# Patient Record
Sex: FEMALE | Race: WHITE | NOT HISPANIC OR LATINO | Employment: OTHER | URBAN - NONMETROPOLITAN AREA
[De-identification: names, ages, dates, MRNs, and addresses within clinical notes are randomized per-mention and may not be internally consistent; named-entity substitution may affect disease eponyms.]

---

## 2017-04-03 ENCOUNTER — AMBULATORY - GICH (OUTPATIENT)
Dept: FAMILY MEDICINE | Facility: OTHER | Age: 62
End: 2017-04-03

## 2017-04-03 ENCOUNTER — HISTORY (OUTPATIENT)
Dept: FAMILY MEDICINE | Facility: OTHER | Age: 62
End: 2017-04-03

## 2017-04-03 ENCOUNTER — OFFICE VISIT - GICH (OUTPATIENT)
Dept: FAMILY MEDICINE | Facility: OTHER | Age: 62
End: 2017-04-03

## 2017-04-03 DIAGNOSIS — S46.911A STRAIN OF UNSPECIFIED MUSCLE, FASCIA AND TENDON AT SHOULDER AND UPPER ARM LEVEL, RIGHT ARM, INITIAL ENCOUNTER: ICD-10-CM

## 2017-04-21 ENCOUNTER — AMBULATORY - GICH (OUTPATIENT)
Dept: SCHEDULING | Facility: OTHER | Age: 62
End: 2017-04-21

## 2017-05-16 ENCOUNTER — AMBULATORY - GICH (OUTPATIENT)
Dept: SCHEDULING | Facility: OTHER | Age: 62
End: 2017-05-16

## 2017-06-06 ENCOUNTER — AMBULATORY - GICH (OUTPATIENT)
Dept: SCHEDULING | Facility: OTHER | Age: 62
End: 2017-06-06

## 2017-06-27 ENCOUNTER — HISTORY (OUTPATIENT)
Dept: FAMILY MEDICINE | Facility: OTHER | Age: 62
End: 2017-06-27

## 2017-06-27 ENCOUNTER — OFFICE VISIT - GICH (OUTPATIENT)
Dept: FAMILY MEDICINE | Facility: OTHER | Age: 62
End: 2017-06-27

## 2017-06-27 DIAGNOSIS — E78.00 PURE HYPERCHOLESTEROLEMIA: ICD-10-CM

## 2017-06-27 DIAGNOSIS — Z01.818 ENCOUNTER FOR OTHER PREPROCEDURAL EXAMINATION: ICD-10-CM

## 2017-06-27 DIAGNOSIS — I10 ESSENTIAL (PRIMARY) HYPERTENSION: ICD-10-CM

## 2017-06-27 LAB
ABSOLUTE BASOPHILS - HISTORICAL: 0.1 THOU/CU MM
ABSOLUTE EOSINOPHILS - HISTORICAL: 0.1 THOU/CU MM
ABSOLUTE IMMATURE GRANULOCYTES(METAS,MYELOS,PROS) - HISTORICAL: 0 THOU/CU MM
ABSOLUTE LYMPHOCYTES - HISTORICAL: 2.5 THOU/CU MM (ref 0.9–2.9)
ABSOLUTE MONOCYTES - HISTORICAL: 0.7 THOU/CU MM
ABSOLUTE NEUTROPHILS - HISTORICAL: 3.8 THOU/CU MM (ref 1.7–7)
ALBUMIN SERPL-MCNC: 4.5 G/DL (ref 3.5–5.7)
ANION GAP - HISTORICAL: 10 (ref 5–18)
BASOPHILS # BLD AUTO: 0.7 %
BUN SERPL-MCNC: 22 MG/DL (ref 7–25)
BUN/CREAT RATIO - HISTORICAL: 28
CALCIUM SERPL-MCNC: 10.4 MG/DL (ref 8.6–10.3)
CHLORIDE SERPLBLD-SCNC: 100 MMOL/L (ref 98–107)
CO2 SERPL-SCNC: 27 MMOL/L (ref 21–31)
CREAT SERPL-MCNC: 0.8 MG/DL (ref 0.7–1.3)
EOSINOPHIL NFR BLD AUTO: 1.1 %
ERYTHROCYTE [DISTWIDTH] IN BLOOD BY AUTOMATED COUNT: 13.7 % (ref 11.5–15.5)
GFR IF NOT AFRICAN AMERICAN - HISTORICAL: >60 ML/MIN/1.73M2
GLUCOSE SERPL-MCNC: 96 MG/DL (ref 70–105)
HCT VFR BLD AUTO: 43 % (ref 33–51)
HEMOGLOBIN: 15 G/DL (ref 12–16)
IMMATURE GRANULOCYTES(METAS,MYELOS,PROS) - HISTORICAL: 0.1 %
LYMPHOCYTES NFR BLD AUTO: 35.8 % (ref 20–44)
MCH RBC QN AUTO: 30.5 PG (ref 26–34)
MCHC RBC AUTO-ENTMCNC: 34.9 G/DL (ref 32–36)
MCV RBC AUTO: 87 FL (ref 80–100)
MONOCYTES NFR BLD AUTO: 9.2 %
NEUTROPHILS NFR BLD AUTO: 53.1 % (ref 42–72)
PHOSPHATE SERPL-MCNC: 3.5 MG/DL (ref 2.5–5)
PLATELET # BLD AUTO: 332 THOU/CU MM (ref 140–440)
PMV BLD: 9.3 FL (ref 6.5–11)
POTASSIUM SERPL-SCNC: 3.6 MMOL/L (ref 3.5–5.1)
RED BLOOD COUNT - HISTORICAL: 4.92 MIL/CU MM (ref 4–5.2)
SODIUM SERPL-SCNC: 137 MMOL/L (ref 133–143)
WHITE BLOOD COUNT - HISTORICAL: 7.1 THOU/CU MM (ref 4.5–11)

## 2017-06-28 ENCOUNTER — AMBULATORY - GICH (OUTPATIENT)
Dept: FAMILY MEDICINE | Facility: OTHER | Age: 62
End: 2017-06-28

## 2017-06-28 DIAGNOSIS — I10 ESSENTIAL (PRIMARY) HYPERTENSION: ICD-10-CM

## 2017-07-09 ENCOUNTER — COMMUNICATION - GICH (OUTPATIENT)
Dept: FAMILY MEDICINE | Facility: OTHER | Age: 62
End: 2017-07-09

## 2017-07-09 DIAGNOSIS — E78.00 PURE HYPERCHOLESTEROLEMIA: ICD-10-CM

## 2017-07-21 ENCOUNTER — AMBULATORY - GICH (OUTPATIENT)
Dept: SCHEDULING | Facility: OTHER | Age: 62
End: 2017-07-21

## 2017-08-15 ENCOUNTER — HISTORY (OUTPATIENT)
Dept: FAMILY MEDICINE | Facility: OTHER | Age: 62
End: 2017-08-15

## 2017-08-15 ENCOUNTER — OFFICE VISIT - GICH (OUTPATIENT)
Dept: FAMILY MEDICINE | Facility: OTHER | Age: 62
End: 2017-08-15

## 2017-08-15 DIAGNOSIS — L65.8 OTHER SPECIFIED NONSCARRING HAIR LOSS: ICD-10-CM

## 2017-08-15 DIAGNOSIS — E66.9 OBESITY: ICD-10-CM

## 2017-08-15 DIAGNOSIS — I10 ESSENTIAL (PRIMARY) HYPERTENSION: ICD-10-CM

## 2017-08-15 DIAGNOSIS — Z00.00 ENCOUNTER FOR GENERAL ADULT MEDICAL EXAMINATION WITHOUT ABNORMAL FINDINGS: ICD-10-CM

## 2017-08-15 DIAGNOSIS — E78.00 PURE HYPERCHOLESTEROLEMIA: ICD-10-CM

## 2017-08-15 LAB
ALT (SGPT) - HISTORICAL: 22 IU/L (ref 7–52)
AST SERPL-CCNC: 20 IU/L (ref 13–39)
CHOL/HDL RATIO - HISTORICAL: 2.92
CHOLESTEROL TOTAL: 190 MG/DL
HDLC SERPL-MCNC: 65 MG/DL (ref 23–92)
LDLC SERPL CALC-MCNC: 104 MG/DL
NON-HDL CHOLESTEROL - HISTORICAL: 125 MG/DL
PATIENT STATUS - HISTORICAL: ABNORMAL
TRIGL SERPL-MCNC: 105 MG/DL

## 2017-08-15 ASSESSMENT — PATIENT HEALTH QUESTIONNAIRE - PHQ9: SUM OF ALL RESPONSES TO PHQ QUESTIONS 1-9: 2

## 2017-09-06 ENCOUNTER — COMMUNICATION - GICH (OUTPATIENT)
Dept: FAMILY MEDICINE | Facility: OTHER | Age: 62
End: 2017-09-06

## 2017-09-06 DIAGNOSIS — I10 ESSENTIAL (PRIMARY) HYPERTENSION: ICD-10-CM

## 2017-09-12 ENCOUNTER — AMBULATORY - GICH (OUTPATIENT)
Dept: SCHEDULING | Facility: OTHER | Age: 62
End: 2017-09-12

## 2017-10-10 ENCOUNTER — AMBULATORY - GICH (OUTPATIENT)
Dept: SCHEDULING | Facility: OTHER | Age: 62
End: 2017-10-10

## 2017-11-14 ENCOUNTER — AMBULATORY - GICH (OUTPATIENT)
Dept: SCHEDULING | Facility: OTHER | Age: 62
End: 2017-11-14

## 2017-12-27 NOTE — PROGRESS NOTES
Patient Information     Patient Name MRN Sex Merle Tierney [id number] Female 1955      Progress Notes by Merle Loza MD at 2017 12:45 PM     Author:  Merle Loza MD Service:  (none) Author Type:  Physician     Filed:  2017  6:04 PM Encounter Date:  2017 Status:  Signed     :  Merle Loza MD (Physician)            See H and P

## 2017-12-28 NOTE — TELEPHONE ENCOUNTER
Patient Information     Patient Name MRN Sex Merle Tierney [id number] Female 1955      Telephone Encounter by Crystal Orellana RN at 2017  3:32 PM     Author:  Crystal Orellana RN Service:  (none) Author Type:  NURS- Registered Nurse     Filed:  2017  3:40 PM Encounter Date:  2017 Status:  Signed     :  Crystal Orellana RN (NURS- Registered Nurse)            [facility] and pt request refill Rxs for enalapril (Vasotec) and chlorthialidone (Hygroton).  Both Rxs were filled on 8-15-17 with Qty 90 and Refill 3 by Dr. DIONE Loza.    This RN refused / declined Rx refill request due to pharmacy and pt requesting refill Rxs too soon.    Unable to complete prescription refill per RN Medication Refill Policy.................... Crystal Orellana RN ....................  2017   3:40 PM

## 2017-12-28 NOTE — PROGRESS NOTES
Patient Information     Patient Name MRN Sex Merle Tierney [id number] Female 1955      Progress Notes by Merle Loza MD at 8/15/2017  8:15 AM     Author:  Merle Loza MD Service:  (none) Author Type:  Physician     Filed:  2017  5:49 PM Encounter Date:  8/15/2017 Status:  Signed     :  Merle Loza MD (Physician)            Nursing Notes:   Dara Berger  8/15/2017  8:58 AM  Signed  Patient is here to establish care and annual physical. Patient complaining of numbness and pain in right leg and heel. States numbness started after shoulder surgery 2017. Patient states heel pain has been since school ended. Patient states at pre op appointment was discussed changing Medication, Patient would like to discuss further and decide what for refills will be needed.  Dara Berger LPN .............8/15/2017  8:12 AM        Subjective:  Merle Herrera is a 62 y.o. female who presents for routine physical exam       Routine physical examination  She is due for mammmogram , colonoscopy.  Declines breast exam ;  Patient has had two kids vaginally.  No pelvic pain ,no pressure.  No bleeding.  No breast lumps, bumps.  No family history breast, ovarian or uterine cancer.             Essential hypertension, benign   labs in may were normal.   She needs refill of vasotec/ hygroton ;  She does not want to switch medications as 'this seems to be working well'   ROS: denies any changes in hearing/ vision;   Denies chest pain, palpitations, shortness of breath or dyspnea on exertion.   Denies changes in bowel or bladder or indigestion.   Denies any pedal edema.             Pure hypercholesterolemia   on pravachol.   Needs refill.  Fasting today       Heel pain     She is having some right  heel pain;  She has found new shoe and feels better.  Declines xrays ;  Symptoms started a few months ago.  No swelling in legs that is unusual              Obesity, unspecified  obesity severity, unspecified obesity type     Wt Readings from Last 3 Encounters:    08/15/17 105.5 kg (232 lb 9.6 oz)   06/27/17 103.5 kg (228 lb 3.2 oz)   04/03/17 103.1 kg (227 lb 6.4 oz)          No Known Allergies  Current Outpatient Prescriptions on File Prior to Visit       Medication  Sig Dispense Refill     aspirin enteric coated 81 mg tablet Take 1 tablet by mouth once daily with a meal.  0     BIOTIN ORAL Take 5,000 capsules by mouth once daily.       calcium carbonate-vitamin D3, 600 mg-400 unit, (CALCIUM 600 + D) tablet Take 1 tablet by mouth 2 times daily with meals.  0     cholecalciferol (VITAMIN D) 1,000 unit tablet Take 1 tablet by mouth once daily.  0     omega-3 fatty acids-vitamin E (FISH OIL) 1,000 mg cap Take 1 capsule by mouth 2 times daily.  0     pravastatin (PRAVACHOL) 20 mg tablet TAKE 1 TABLET BY MOUTH AT BEDTIME. 90 tablet 3     No current facility-administered medications on file prior to visit.        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    Enjoys walking, fishing.                    Family Hx:   Family History       Problem   Relation Age of Onset     Hypertension  Mother      Heart Disease  Mother      Osteoporosis  Mother      Other  Mother      d pneumonia age 74       Hypertension  Father      Heart Disease  Father      d ht failure, 84       Hypertension  Brother      Heart Disease  Brother      Stroke  Brother 57     d age 67       Diabetes  Sister       "Heart Disease  Sister      pacemaker       Good Health  Sister      Cancer-breast  No Family History        Objective:  /82  Pulse 66  Ht 1.575 m (5' 2\")  Wt 105.5 kg (232 lb 9.6 oz)  BMI 42.54 kg/m2   Patient appears well, alert and oriented x 3, pleasant, cooperative. Thinning hair  JOURDAN. TM's clear, Oral pharynx with good dentition, without lesion, erythema or exudate. Moist mucous membranes. Neck supple and free of adenopathy, or masses. No thyromegaly. Lungs are clear, without wheezes, rhonchi or rales. Heart sounds are normal, no murmurs, clicks, gallops or rubs. Small amount of yeast type rash under bilateral breasts otherwise skin intact  Abdomen is obese, soft, no tenderness, masses or organomegaly. No CVAT.  Extremities are without edema. Peripheral pulses are normal. Screening neurological exam is normal without focal findings.   Heels with chaffing.  mininmal tenderness with palpation over right heel insertion site of plantar fascia  Right shoulder with post operative changes.  Small stitch removed without incident.  Scar well healed.     Declined breast and pelvic exam     Assessment:    ICD-10-CM    1. Routine physical examination Z00.00 XR MAMMO BILAT SCREENING      COLONOSCOPY SCREENING   2. Essential hypertension, benign I10 enalapril (VASOTEC) 20 mg tablet      chlorthalidone (HYGROTON) 25 mg tablet   3. Pure hypercholesterolemia E78.00 LIPID PANEL      AST (SGOT)      ALT (SGPT)      LIPID PANEL      AST (SGOT)      ALT (SGPT)   4. Obesity, unspecified obesity severity, unspecified obesity type E66.9    5. Female pattern alopecia L65.8 finasteride (PROSCAR) 5 mg tablet        Ms. Fraga Body mass index is 42.54 kg/(m^2). This is out of the normal range for a 62 y.o. Normal range for ages 18+ is between 18.5 and 24.9. To lose weight we reviewed risks and benefits of appropriate options such as diet, exercise, and medications. Patient's strategy will be  self-directed nutrition plan and " self-directed exercise program      Plan:   -- Expected clinical course discussed   -- Medications and their side effects discussed  Patient Instructions      Car Safety tips:   Wear your seatbelt at all times.   Do not drive when tired.  If drinking alcohol, find a designated .  Do NOT EVER text and drive!   Consider DRIVE MODE braxton for your phone.     Vitamin E oil to scar on right shoulder    Increase exercise as able to 30 minutes a day.  Consider pool exercises    Okay to have referral to podiatry for feet at location of choice due to flat feet, heel pain and hammer toe ( right 3rd )      Propecia refilled but at 5 mg;   Printed Rx.  Discuss with your dermatologist.     Referral to Dr. Nguyen for colonoscopy       Labs will be placed in your [employer] account    Results for orders placed or performed in visit on 06/27/17      RENAL FUNCTION PANEL      Result  Value Ref Range    SODIUM 137 133 - 143 mmol/L    POTASSIUM 3.6 3.5 - 5.1 mmol/L    CHLORIDE 100 98 - 107 mmol/L    CO2,TOTAL 27 21 - 31 mmol/L    ANION GAP 10 5 - 18                    GLUCOSE 96 70 - 105 mg/dL    CALCIUM 10.4 (H) 8.6 - 10.3 mg/dL    BUN 22 7 - 25 mg/dL    CREATININE 0.80 0.70 - 1.30 mg/dL    BUN/CREAT RATIO           28                    GFR if African American >60 >60 ml/min/1.73m2    GFR if not African American >60 >60 ml/min/1.73m2    PHOSPHORUS 3.5 2.5 - 5.0 mg/dL    ALBUMIN 4.5 3.5 - 5.7 g/dL   CBC WITH AUTO DIFFERENTIAL      Result  Value Ref Range    WHITE BLOOD COUNT         7.1 4.5 - 11.0 thou/cu mm    RED BLOOD COUNT           4.92 4.00 - 5.20 mil/cu mm    HEMOGLOBIN                15.0 12.0 - 16.0 g/dL    HEMATOCRIT                43.0 33.0 - 51.0 %    MCV                       87 80 - 100 fL    MCH                       30.5 26.0 - 34.0 pg    MCHC                      34.9 32.0 - 36.0 g/dL    RDW                       13.7 11.5 - 15.5 %    PLATELET COUNT            332 140 - 440 thou/cu mm    MPV                       9.3  6.5 - 11.0 fL    NEUTROPHILS               53.1 42.0 - 72.0 %    LYMPHOCYTES               35.8 20.0 - 44.0 %    MONOCYTES                 9.2 <12.0 %    EOSINOPHILS               1.1 <8.0 %    BASOPHILS                 0.7 <3.0 %    IMMATURE GRANULOCYTES(METAS,MYELOS,PROS) 0.1 %    ABSOLUTE NEUTROPHILS      3.8 1.7 - 7.0 thou/cu mm    ABSOLUTE LYMPHOCYTES      2.5 0.9 - 2.9 thou/cu mm    ABSOLUTE MONOCYTES        0.7 <0.9 thou/cu mm    ABSOLUTE EOSINOPHILS      0.1 <0.5 thou/cu mm    ABSOLUTE BASOPHILS        0.1 <0.3 thou/cu mm    ABSOLUTE IMMATURE GRANULOCYTES(METAS,MYELOS,PROS) 0.0 <=0.3 thou/cu mm       Work on following  a mediterranean diet; Use monosaturated fats ( olive , canola and peanut   oil; nuts, avocados), abundance of plant foods which are minimally processed, fish (salmon).  Exercise 30 minutes every day  [facility] staff will call you with your mammogram appointment.   Try to get 7-8 hours sleep each night.  Rest is important!    Please consider the following general health recommendations:    Schedule a mammogram annually.     Eat a quality diet (generally, low in simple sugars, starches, cholesterol and saturated fat.)    Please get 1000 mg of calcium in divided doses with vitamin D in your diet daily.  If you are postmenopausal increase the calcium to 1500 mg daily in divided doses    Exercise regularly.  Ideally you would have 30-60 minutes of aerobic exercise at least 4 times weekly.  Find something you enjoy and a friend to do it with you.    Maintain ideal weight.  Your Body mass index is 42.54 kg/(m^2).. Generally a BMI of 20-25 is considered ideal. Overweight is defined as 25-30, Obese is 30-35 and markedly obese is greater than 35.    Apply sun block (SPF 25 or greater) on exposed skin anytime you are out in the sun to prevent skin cancer.      Wear a seatbelt whenever you are in a car.    If you are beyond age 60 or had an early menopause for any reason, consider a bone density study every  2-3 years to see if you are at increased risk for fracture.    Check blood sugar annually.  Cholesterol annually unless you have had a normal level when last checked within 5 years.     You should have a tetanus booster at least once every 10 years.        Immunization History     Administered  Date(s) Administered     Hepatitis A (Adult) 03/11/2003, 09/16/2003     Hepatitis B, Unspecified 03/11/2003, 04/23/2003, 09/16/2003     Herpes-zoster Vaccine 02/08/2013     Influenza Virus, Unspecified 11/12/2010     Influenza, IIV4 (Age >= 3 Years) 12/27/2016     Tdap 06/09/2008       Strongly consider having a flu shot every fall.     You should have a pap  every 3 years between the ages of 30 and 70 unless you have had previous abnormal pap smear, (in these cases the exams and PAP's should be done yearly). If you have had hysterectomy in the past, your future Pap plan may be different.     Colonoscopy (an exam of the colon) is recommended every 10 years after the age of 50 to screen for colon cancer.  (More often if you are at increased risk.)    A vaccine to reduce your chances of getting shingles is available if you are over 60. Information about the vaccine is available through the clinic or at:  (http://www.nlm.nih.gov/medlineplus/druginfo/medmaster/s583704.html)     Handouts on heel pain, plantar fasciitis, plantar fasciitis exercises, nutrition, varicose veins provided to patient       Electronically signed by Merle Loza MD

## 2017-12-28 NOTE — TELEPHONE ENCOUNTER
Patient Information     Patient Name MRN Sex Merle Tierney [id number] Female 1955      Telephone Encounter by Crystal Noel RN at 2017 10:21 AM     Author:  Crystal Noel RN Service:  (none) Author Type:  NURS- Registered Nurse     Filed:  2017 10:25 AM Encounter Date:  2017 Status:  Signed     :  Crystal Noel RN (NURS- Registered Nurse)            Statins    Office visit in the past 12 months.    Last visit with JUANIS POTTS was on: 2016 in [facility]  Next visit with JUNAIS POTTS is on: No future appointment listed with this provider  Next visit with Family Practice is on: No future appointment listed in this department    Lab testing requirements:  Lipids annually.  Repeat lipids 6-8 weeks after dosage or drug change.    Last Lipids:  Chol: 205    2016  T    2016  HDL:   62    2016  LDL:  111    2016  LDL DIRECT:  No results found in past 5 years    .    Concommitant use of fibrates and statins-If it is an addition to the medication list, review note and/or discuss with provider.  If already on medication list, refill.    Max refills 12 months from last office visit.      Medication last addressed by Merle Loza MD  At pre-op appointment on 17.     Prescription refilled per RN Medication Refill Policy.................... Crystal Noel RN ....................  2017   10:24 AM

## 2017-12-29 NOTE — PATIENT INSTRUCTIONS
Patient Information     Patient Name MRN Sex Merle Tierney [id number] Female 1955      Patient Instructions by Merle Loza MD at 8/15/2017  9:03 AM     Author:  Merle Loza MD  Service:  (none) Author Type:  Physician     Filed:  2017  5:32 PM  Encounter Date:  8/15/2017 Status:  Addendum     :  Merle Loza MD (Physician)        Related Notes: Original Note by Merle Loza MD (Physician) filed at 8/15/2017  9:16 AM             Car Safety tips:   Wear your seatbelt at all times.   Do not drive when tired.  If drinking alcohol, find a designated .  Do NOT EVER text and drive!   Consider DRIVE MODE braxton for your phone.     Vitamin E oil to scar on right shoulder    Increase exercise as able to 30 minutes a day.  Consider pool exercises    Okay to have referral to podiatry for feet at location of choice due to flat feet, heel pain and hammer toe ( right 3rd )      Propecia refilled but at 5 mg;   Printed Rx.  Discuss with your dermatologist.     Referral to Dr. Nguyen for colonoscopy       Labs will be placed in your Mychart account    Results for orders placed or performed in visit on 17      RENAL FUNCTION PANEL      Result  Value Ref Range    SODIUM 137 133 - 143 mmol/L    POTASSIUM 3.6 3.5 - 5.1 mmol/L    CHLORIDE 100 98 - 107 mmol/L    CO2,TOTAL 27 21 - 31 mmol/L    ANION GAP 10 5 - 18                    GLUCOSE 96 70 - 105 mg/dL    CALCIUM 10.4 (H) 8.6 - 10.3 mg/dL    BUN 22 7 - 25 mg/dL    CREATININE 0.80 0.70 - 1.30 mg/dL    BUN/CREAT RATIO           28                    GFR if African American >60 >60 ml/min/1.73m2    GFR if not African American >60 >60 ml/min/1.73m2    PHOSPHORUS 3.5 2.5 - 5.0 mg/dL    ALBUMIN 4.5 3.5 - 5.7 g/dL   CBC WITH AUTO DIFFERENTIAL      Result  Value Ref Range    WHITE BLOOD COUNT         7.1 4.5 - 11.0 thou/cu mm    RED BLOOD COUNT           4.92 4.00 - 5.20 mil/cu mm    HEMOGLOBIN                 15.0 12.0 - 16.0 g/dL    HEMATOCRIT                43.0 33.0 - 51.0 %    MCV                       87 80 - 100 fL    MCH                       30.5 26.0 - 34.0 pg    MCHC                      34.9 32.0 - 36.0 g/dL    RDW                       13.7 11.5 - 15.5 %    PLATELET COUNT            332 140 - 440 thou/cu mm    MPV                       9.3 6.5 - 11.0 fL    NEUTROPHILS               53.1 42.0 - 72.0 %    LYMPHOCYTES               35.8 20.0 - 44.0 %    MONOCYTES                 9.2 <12.0 %    EOSINOPHILS               1.1 <8.0 %    BASOPHILS                 0.7 <3.0 %    IMMATURE GRANULOCYTES(METAS,MYELOS,PROS) 0.1 %    ABSOLUTE NEUTROPHILS      3.8 1.7 - 7.0 thou/cu mm    ABSOLUTE LYMPHOCYTES      2.5 0.9 - 2.9 thou/cu mm    ABSOLUTE MONOCYTES        0.7 <0.9 thou/cu mm    ABSOLUTE EOSINOPHILS      0.1 <0.5 thou/cu mm    ABSOLUTE BASOPHILS        0.1 <0.3 thou/cu mm    ABSOLUTE IMMATURE GRANULOCYTES(METAS,MYELOS,PROS) 0.0 <=0.3 thou/cu mm       Work on following  a mediterranean diet; Use monosaturated fats ( olive , canola and peanut   oil; nuts, avocados), abundance of plant foods which are minimally processed, fish (salmon).  Exercise 30 minutes every day  [facility] staff will call you with your mammogram appointment.   Try to get 7-8 hours sleep each night.  Rest is important!    Please consider the following general health recommendations:    Schedule a mammogram annually.     Eat a quality diet (generally, low in simple sugars, starches, cholesterol and saturated fat.)    Please get 1000 mg of calcium in divided doses with vitamin D in your diet daily.  If you are postmenopausal increase the calcium to 1500 mg daily in divided doses    Exercise regularly.  Ideally you would have 30-60 minutes of aerobic exercise at least 4 times weekly.  Find something you enjoy and a friend to do it with you.    Maintain ideal weight.  Your Body mass index is 42.54 kg/(m^2).. Generally a BMI of 20-25 is considered ideal.  Overweight is defined as 25-30, Obese is 30-35 and markedly obese is greater than 35.    Apply sun block (SPF 25 or greater) on exposed skin anytime you are out in the sun to prevent skin cancer.      Wear a seatbelt whenever you are in a car.    If you are beyond age 60 or had an early menopause for any reason, consider a bone density study every 2-3 years to see if you are at increased risk for fracture.    Check blood sugar annually.  Cholesterol annually unless you have had a normal level when last checked within 5 years.     You should have a tetanus booster at least once every 10 years.        Immunization History     Administered  Date(s) Administered     Hepatitis A (Adult) 03/11/2003, 09/16/2003     Hepatitis B, Unspecified 03/11/2003, 04/23/2003, 09/16/2003     Herpes-zoster Vaccine 02/08/2013     Influenza Virus, Unspecified 11/12/2010     Influenza, IIV4 (Age >= 3 Years) 12/27/2016     Tdap 06/09/2008       Strongly consider having a flu shot every fall.     You should have a pap  every 3 years between the ages of 30 and 70 unless you have had previous abnormal pap smear, (in these cases the exams and PAP's should be done yearly). If you have had hysterectomy in the past, your future Pap plan may be different.     Colonoscopy (an exam of the colon) is recommended every 10 years after the age of 50 to screen for colon cancer.  (More often if you are at increased risk.)    A vaccine to reduce your chances of getting shingles is available if you are over 60. Information about the vaccine is available through the clinic or at:  (http://www.nlm.nih.gov/medlineplus/druginfo/medmaster/v815914.html)     Handouts on heel pain, plantar fasciitis, plantar fasciitis exercises, nutrition, varicose veins provided to patient

## 2017-12-29 NOTE — PATIENT INSTRUCTIONS
Patient Information     Patient Name MRN Sex Merle Tierney [id number] Female 1955      Patient Instructions by Merle Loza MD at 2017 12:45 PM     Author:  Merle Loza MD  Service:  (none) Author Type:  Physician     Filed:  2017  1:51 PM  Encounter Date:  2017 Status:  Addendum     :  Merle Loza MD (Physician)        Related Notes: Original Note by Merle Loza MD (Physician) filed at 2017  1:45 PM            RECOMMENDATIONS:    Nothing to eat after midnight night prior to surgery.  Avoid Aspirin,  NSAIDS (nonsteroidal anti-inflammatory medications) like ibuprofen or aleve over the counter ten days prior to surgery.   Stop fish oil tablets 10 days prior to surgery  Okay to take your blood pressure medications with small sip of water am of surgery   It is okay to use tylenol with maximum tylenol use 3 grams in 24 hours.  If you have a febrile illness or productive cough, please contact your surgeon's office.      Post operative pain medication, including narcotic medication will be managed by your surgeon.  You should not require narcotic pain medication after two weeks.   Therefore, these medications will not be refilled by my office.           Weight Loss Strategies  1. Eat at least 3 meals a day and never skip breakfast.  2. Eat more slowly.  3. Decrease portion size.  4. Provide structure by using meal replacement bars or shakes, and/or low calorie frozen meals.  5. For good nutrition incorporate fruit, vegetables, whole grains, lean protein, and low-fat dairy.  6. Remove trigger foods from your environment to avoid impulse eating.  7. Increase physical activity: get a pedometer and aim for 10,000 steps a day or 30-35 minutes of activity 5 days per week.  8. Weigh yourself daily or at least weekly.  9. Keep a record of what you eat and your activity.  10. Establish a support system such as a friend, group or  "program.      11.  Read Antelmo Cason's \"Eat to Live\"   12.   Remember it is important to have a minimum of 1400 calories a day, okay to use olive oil, 40 grams of fiber daily.  No more than two servings ( the size of your palm) of red meat a week.           "

## 2017-12-29 NOTE — H&P
Patient Information     Patient Name MRN Sex Merle Tierney [id number] Female 1955      H&P by Merle Loza MD at 2017 12:45 PM     Author:  Merle Loza MD Service:  (none) Author Type:  Physician     Filed:  2017  6:04 PM Encounter Date:  2017 Status:  Signed     :  Merle Loza MD (Physician)            Nursing Notes:   Erica Phipps  2017 12:52 PM  Unsigned  This patient presents today for a Preoperative exam for this procedure: Rt Shoulder Arthroscopy   Date of Surgery: 17   Surgeon:  Dr. Linden Alcantar  Facility:  [facility]   Erica Phipps LPN..............2017 12:51 PM    PREOPERATIVE Anesthesia Medical Evaluation  Date of Exam: 2017    Nursing Notes:   Erica Phipps  2017 12:52 PM  Unsigned  This patient presents today for a Preoperative exam for this procedure: Rt Shoulder Arthroscopy   Date of Surgery: 17   Surgeon:  Dr. Linden Alcantar  Facility:  [facility]   Erica Phipps LPN..............2017 12:51 PM       HPI:    Preop     Patient is here at the request of Dr.Robin Alcantar prior to undergoing at the [facility]  surgery on date noted above.  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  She fell on her left side reaching over with right arm jarring her shoulder.  She has tried and failed conservative medical management.  She has met with Dr. Alcantar who is recommending surgical repair after MRI which showed tear in rotator cuff by outside report.  She also has severe AC joint arthritis. Shoulder pain is limiting her activites including writing, lifting above shoulder, reaching. She can not erase boards.     Hypertension   She has history hypertension.  She takes vasotec 20 mg at night and hygroton 25 mg in am a day.     denies any changes in hearing/ vision;   Denies chest  pain, palpitations, shortness of breath or dyspnea on exertion.   Denies changes in bowel or bladder or indigestion.   Denies any pedal edema.        High cholesterol  Takes pravachol 20 mg at night .  No muscle aches.       Proposed anesthesia:  General   Anesthesia Complications: trouble with spinal block with second knee replacement.   ( numbed the wrong side ) ; no problems with general anesthesia   History of abnormal bleeding : none   History of Blood Transfusions: none       Cards risk factors:     History    Smoking Status      Never Smoker   Smokeless Tobacco      Never Used   ,   LDL CHOLESTEROL (mg/dL)    Date Value   2016 111 (H)   ,   BP Readings from Last 1 Encounters:    17 132/80     ,   GLUCOSE (mg/dL)    Date Value   2016 94   , No results found for: HGBA1C,  yes early heart history in family Mother MI 64   MI of neumonia age 74, Dad MI 60; brother stroke age 57     CPR: yes  Allergies: Review of patient's allergies indicates no known allergies.   Latex Allergy: no  Immunization History     Administered  Date(s) Administered     Hepatitis A (Adult) 2003, 2003     Hepatitis B, Unspecified 2003, 2003, 2003     Herpes-zoster Vaccine 2013     Influenza Virus, Unspecified 2010     Influenza, IIV4 (Age >= 3 Years) 2016     Tdap 2008         Preoperative Evaluation: Obstructive Sleep Apnea screening    S: Snore -  Do you snore loudly? (louder than talking or loud enough to be heard through closed doors)(NO)  T: Tired - Do you often feel tired, fatigued, or sleepy during the daytime?(NO)  O: Observed - Has anyone ever observed you stop breathing during your sleep?(NO)  P: Pressure - Do you have or are you being treated for high blood pressure?(YES)  B: BMI - BMI greater than 35kg/m2?(YES)  A: Age - Age over 50 years old?(YES)  N: Neck - Neck circumference greater than 40 cm?(NO) 39.5 cm   G: Gender - Gender: Male?(NO)  Total number  "of \"YES\" responses:  3    Scoring: Low risk of MAGGIE 0-2  At Risk of MAGGIE: >3 High Risk of MAGGIE: 5-8      Problem List:   Patient Active Problem List     Diagnosis  Code     ONYCHOMYCOSIS, BILATERAL B35.1     Obesity E66.9     Essential hypertension, benign I10     MIXED INCONTINENCE URGE AND STRESS N39.46     ANKLE EDEMA R60.9     HAMMER TOE, ACQUIRED M20.40     POSTMENOPAUSAL STATUS N95.1     Osteoarthritis M19.90     Tremor R25.1     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    Enjoys walking, fishing.                  Obstetric History       T2      L2     SAB0   TAB0   Ectopic0   Multiple0   Live Births0      Family History       Problem   Relation Age of Onset     Hypertension  Mother      Heart Disease  Mother      Osteoporosis  Mother      Other  Mother      d pneumonia age 74       Hypertension  Father      Heart Disease  Father      d ht failure, 84       Hypertension  Brother      Heart Disease  " "Brother      Stroke  Brother 57     d age 67       Diabetes  Sister      Heart Disease  Sister      pacemaker       Good Health  Sister      Cancer-breast  No Family History         Current Medications:  Current Outpatient Rx       Medication  Sig Dispense Refill     aspirin enteric coated 81 mg tablet Take 1 tablet by mouth once daily with a meal.  0     BIOTIN ORAL Take 5,000 capsules by mouth once daily.       calcium carbonate-vitamin D3, 600 mg-400 unit, (CALCIUM 600 + D) tablet Take 1 tablet by mouth 2 times daily with meals.  0     chlorthalidone (HYGROTON) 25 mg tablet Take 1 tablet by mouth once daily. 90 tablet 3     cholecalciferol (VITAMIN D) 1,000 unit tablet Take 1 tablet by mouth once daily.  0     enalapril (VASOTEC) 20 mg tablet Take 1 tablet by mouth once daily. 90 tablet 3     FINASTERIDE (PROPECIA ORAL) Take 20 mg by mouth once daily.       omega-3 fatty acids-vitamin E (FISH OIL) 1,000 mg cap Take 1 capsule by mouth 2 times daily.  0     pravastatin (PRAVACHOL) 20 mg tablet Take 1 tablet by mouth at bedtime. 90 tablet 3     Medications have been reviewed by me and are current to the best of my knowledge and ability.    Recent use of: aspirin; will plan to stop July 2nd     HABITS:     Health Care Directive or Living Will: no    REVIEW OF SYSTEMS:  Fever/Chills or other infectious symptoms in past month:  (NO)   >10lb weight loss in past two months:  (NO)     No fever, chills, nausea, vomiting or diarrhea,.  No upper respiratory infection , no post nasal drip.   No chest pain, no rash.      EXAM:   /80  Pulse 96  Temp 97.9  F (36.6  C) (Tympanic)   Ht 1.6 m (5' 3\")  Wt 103.5 kg (228 lb 3.2 oz)  SpO2 96%  Breastfeeding? No  BMI 40.42 kg/m2 Body mass index is 40.42 kg/(m^2).  Patient appears well, alert and oriented x 3, pleasant, cooperative. Thinning hair.  Well groomed.  JOURDAN. TM's clear, Oral pharynx with good dentition, without lesion, erythema or exudate. Moist mucous membranes. " Neck supple and free of adenopathy, or masses. No thyromegaly. Lungs are clear, without wheezes, rhonchi or rales. Heart sounds are normal, no murmurs, clicks, gallops or rubs. Abdomen is soft,obese  no tenderness, masses or organomegaly. No CVAT.  Extremities are without edema. Peripheral pulses are normal. Screening neurological exam is normal without focal findings. Skin is normal without suspicious lesions noted.  + impingement in right shoulder.       DIAGNOSTICS:   1. EKG:  EKG NSR; vent rate 74   2. CXR:   Not indicated   3. Labs:        Results for orders placed or performed in visit on 06/27/17      RENAL FUNCTION PANEL      Result  Value Ref Range    SODIUM 137 133 - 143 mmol/L    POTASSIUM 3.6 3.5 - 5.1 mmol/L    CHLORIDE 100 98 - 107 mmol/L    CO2,TOTAL 27 21 - 31 mmol/L    ANION GAP 10 5 - 18                    GLUCOSE 96 70 - 105 mg/dL    CALCIUM 10.4 (H) 8.6 - 10.3 mg/dL    BUN 22 7 - 25 mg/dL    CREATININE 0.80 0.70 - 1.30 mg/dL    BUN/CREAT RATIO           28                    GFR if African American >60 >60 ml/min/1.73m2    GFR if not African American >60 >60 ml/min/1.73m2    PHOSPHORUS 3.5 2.5 - 5.0 mg/dL    ALBUMIN 4.5 3.5 - 5.7 g/dL   CBC WITH AUTO DIFFERENTIAL      Result  Value Ref Range    WHITE BLOOD COUNT         7.1 4.5 - 11.0 thou/cu mm    RED BLOOD COUNT           4.92 4.00 - 5.20 mil/cu mm    HEMOGLOBIN                15.0 12.0 - 16.0 g/dL    HEMATOCRIT                43.0 33.0 - 51.0 %    MCV                       87 80 - 100 fL    MCH                       30.5 26.0 - 34.0 pg    MCHC                      34.9 32.0 - 36.0 g/dL    RDW                       13.7 11.5 - 15.5 %    PLATELET COUNT            332 140 - 440 thou/cu mm    MPV                       9.3 6.5 - 11.0 fL    NEUTROPHILS               53.1 42.0 - 72.0 %    LYMPHOCYTES               35.8 20.0 - 44.0 %    MONOCYTES                 9.2 <12.0 %    EOSINOPHILS               1.1 <8.0 %    BASOPHILS                 0.7 <3.0  %    IMMATURE GRANULOCYTES(METAS,MYELOS,PROS) 0.1 %    ABSOLUTE NEUTROPHILS      3.8 1.7 - 7.0 thou/cu mm    ABSOLUTE LYMPHOCYTES      2.5 0.9 - 2.9 thou/cu mm    ABSOLUTE MONOCYTES        0.7 <0.9 thou/cu mm    ABSOLUTE EOSINOPHILS      0.1 <0.5 thou/cu mm    ABSOLUTE BASOPHILS        0.1 <0.3 thou/cu mm    ABSOLUTE IMMATURE GRANULOCYTES(METAS,MYELOS,PROS) 0.0 <=0.3 thou/cu mm          IMPRESSION:   Satisfactory Preoperative Anesthesia Medical Evaluation;    For above listed surgery and anesthesia:   Patient is low risk for perioperative complications.    Patient is on chronic pain medications (NO);   Patient is on antiplatlet/anticoagulation (YES)  Other medications that need adjustment perioperatively (YES)        Patient may proceed with surgery with appropriate anesthesia.  Labs/ Ekg to be faxed to surgeon's office.   Patient informed NPO after midnight night prior to surgery, to avoid NSAIDS, ASA, fish oil, and flax seed  over the counter ten days prior to surgery.   If  febrile illness or productive cough, please contact the surgeon's office.       I appreciate in advance compassionate care of this patient by Dr. Alcantar  and surgical staff.         2. Essential hypertension, benign  Doing well on current meds.   No change;     - EKG 12 LEAD; Future    3. Pure hypercholesterolemia   Work on following  a mediterranean diet; Use monosaturated fats ( olive , canola and peanut   oil; nuts, avocados), abundance of plant foods which are minimally processed,   fish (salmon).      Electronically signed by Merle Loza MD

## 2017-12-30 NOTE — NURSING NOTE
Patient Information     Patient Name MRN Sex Merle Tierney [id number] Female 1955      Nursing Note by Dara Berger at 8/15/2017  8:15 AM     Author:  Dara Berger Service:  (none) Author Type:  (none)     Filed:  8/15/2017  8:58 AM Encounter Date:  8/15/2017 Status:  Signed     :  Dara Berger            Patient is here to establish care and annual physical. Patient complaining of numbness and pain in right leg and heel. States numbness started after shoulder surgery 2017. Patient states heel pain has been since school ended. Patient states at pre op appointment was discussed changing Medication, Patient would like to discuss further and decide what for refills will be needed.  Dara Berger LPN .............8/15/2017  8:12 AM

## 2017-12-30 NOTE — NURSING NOTE
Patient Information     Patient Name MRN Sex Merle Tierney [id number] Female 1955      Nursing Note by Erica Phipps at 2017 12:45 PM     Author:  Erica Phipps Service:  (none) Author Type:  (none)     Filed:  2017  1:31 PM Encounter Date:  2017 Status:  Signed     :  Erica Phipps            This patient presents today for a Preoperative exam for this procedure: Rt Shoulder Arthroscopy   Date of Surgery: 17   Surgeon:  Dr. Linden Alcantar  Facility:  [facility]   Erica Phipps LPN..............2017 12:51 PM

## 2018-01-04 NOTE — NURSING NOTE
Patient Information     Patient Name MRN Merle Stafford [id number] Female 1955      Nursing Note by Nataly Saldana at 4/3/2017  9:30 AM     Author:  Nataly Saldana Service:  (none) Author Type:  (none)     Filed:  4/3/2017 10:00 AM Encounter Date:  4/3/2017 Status:  Signed     :  Nataly Saldana            Would like her Rt shoulder checked fell on 3/17, fell in the school parking lot-works at [employer]  Nataly Saldana ....................  4/3/2017   9:40 AM

## 2018-01-27 VITALS
HEART RATE: 66 BPM | BODY MASS INDEX: 42.8 KG/M2 | SYSTOLIC BLOOD PRESSURE: 144 MMHG | BODY MASS INDEX: 41.32 KG/M2 | WEIGHT: 232.6 LBS | WEIGHT: 227.4 LBS | SYSTOLIC BLOOD PRESSURE: 138 MMHG | HEIGHT: 62 IN | DIASTOLIC BLOOD PRESSURE: 82 MMHG | RESPIRATION RATE: 14 BRPM | DIASTOLIC BLOOD PRESSURE: 82 MMHG

## 2018-01-27 VITALS
DIASTOLIC BLOOD PRESSURE: 80 MMHG | SYSTOLIC BLOOD PRESSURE: 132 MMHG | TEMPERATURE: 97.9 F | BODY MASS INDEX: 40.43 KG/M2 | WEIGHT: 228.2 LBS | OXYGEN SATURATION: 96 % | HEIGHT: 63 IN | HEART RATE: 96 BPM

## 2018-01-30 ENCOUNTER — DOCUMENTATION ONLY (OUTPATIENT)
Dept: FAMILY MEDICINE | Facility: OTHER | Age: 63
End: 2018-01-30

## 2018-01-30 RX ORDER — CHLORTHALIDONE 25 MG/1
25 TABLET ORAL DAILY
COMMUNITY
Start: 2017-08-15

## 2018-01-30 RX ORDER — FINASTERIDE 5 MG/1
5 TABLET, FILM COATED ORAL EVERY MORNING
COMMUNITY
Start: 2017-08-15

## 2018-01-30 RX ORDER — ENALAPRIL MALEATE 20 MG/1
20 TABLET ORAL DAILY
COMMUNITY
Start: 2017-08-15

## 2018-01-30 RX ORDER — PRAVASTATIN SODIUM 20 MG
20 TABLET ORAL AT BEDTIME
COMMUNITY
Start: 2017-07-11 | End: 2018-06-16

## 2018-01-30 RX ORDER — CHLORAL HYDRATE 500 MG
1 CAPSULE ORAL 2 TIMES DAILY
COMMUNITY
Start: 2013-02-08

## 2018-01-30 RX ORDER — ASPIRIN 81 MG/1
81 TABLET ORAL DAILY
COMMUNITY
Start: 2013-02-08

## 2018-01-30 ASSESSMENT — PATIENT HEALTH QUESTIONNAIRE - PHQ9: SUM OF ALL RESPONSES TO PHQ QUESTIONS 1-9: 2

## 2018-06-16 DIAGNOSIS — E78.00 PURE HYPERCHOLESTEROLEMIA: Primary | ICD-10-CM

## 2018-06-16 NOTE — LETTER
June 19, 2018      Merle Herrera  [id number]  [place] [facility] [address]        Dear Merle,     This letter is to remind you that you are due for your annual exam in a couple months.  Due to 's FDC from [facility] we prefer you establish care with another provider.      A LIMITED refill of pravastatin has been called into your pharmacy. Additional refills require you to complete this appointment.    Please call the clinic at [phone] to schedule your appointment and ask any questions you may have.    If you should require additional refills before your scheduled appointment, please contact your pharmacy and we will refill your medication until the date of your appointment.      Thank you for choosing [facility] and [facility] for your health care needs.    Sincerely,    Refill RN  [facility]

## 2018-06-19 RX ORDER — PRAVASTATIN SODIUM 20 MG
TABLET ORAL
Qty: 90 TABLET | Refills: 0 | Status: SHIPPED | OUTPATIENT
Start: 2018-06-19

## 2018-06-19 NOTE — TELEPHONE ENCOUNTER
Pt needs to establish care  Kathi Ibanez RN.............................6/19/2018 9:19 AM    Letter sent  Kathi Ibanez RN.............................6/19/2018 9:19 AM

## 2023-09-12 ENCOUNTER — HOSPITAL ENCOUNTER (OUTPATIENT)
Dept: MRI IMAGING | Facility: OTHER | Age: 68
Discharge: HOME | End: 2023-09-12
Attending: FAMILY MEDICINE | Admitting: FAMILY MEDICINE
Payer: COMMERCIAL

## 2023-09-12 DIAGNOSIS — M70.62 GREATER TROCHANTERIC BURSITIS OF BOTH HIPS: ICD-10-CM

## 2023-09-12 DIAGNOSIS — M70.61 GREATER TROCHANTERIC BURSITIS OF BOTH HIPS: ICD-10-CM

## 2023-09-12 PROCEDURE — 73721 MRI JNT OF LWR EXTRE W/O DYE: CPT | Mod: LT

## 2023-09-25 ENCOUNTER — MEDICAL CORRESPONDENCE (OUTPATIENT)
Dept: HEALTH INFORMATION MANAGEMENT | Facility: OTHER | Age: 68
End: 2023-09-25
Payer: COMMERCIAL

## 2023-10-03 ENCOUNTER — THERAPY VISIT (OUTPATIENT)
Dept: PHYSICAL THERAPY | Facility: OTHER | Age: 68
End: 2023-10-03
Attending: FAMILY MEDICINE
Payer: COMMERCIAL

## 2023-10-03 DIAGNOSIS — S76.012A MUSCLE STRAIN OF GLUTEAL REGION, LEFT, INITIAL ENCOUNTER: ICD-10-CM

## 2023-10-03 DIAGNOSIS — M70.62 GREATER TROCHANTERIC BURSITIS OF LEFT HIP: ICD-10-CM

## 2023-10-03 PROCEDURE — 97110 THERAPEUTIC EXERCISES: CPT | Mod: GP,PO

## 2023-10-03 PROCEDURE — 97161 PT EVAL LOW COMPLEX 20 MIN: CPT | Mod: GP,PO

## 2023-10-03 NOTE — PROGRESS NOTES
"PHYSICAL THERAPY EVALUATION  Type of Visit: Evaluation    See electronic medical record for Abuse and Falls Screening details.    Subjective       Presenting condition or subjective complaint: Patient is a 68 year old female referred to physical therapy with left hip pain. Patient reports that she has been dealing with left hip pain for quite some time. Does have right but not as bad as the left. She reports that there was no injury that brought this pain. Most of her pain is in \"the joint\" and does radiate into the front. She reports that the physician diagnosed it as bursitis. She does have some arthritis in the hip but she reports not bad enough for surgery. She denies numbness and tingling in the leg. She really struggles with going down stairs. She does better going up but when she goes down a lot her pain increases. She reports the left leg is her leg she relies on more and feels this may have lead to more increased pain in the hip. She requests that we do aquatic therapy. Does report that she has been started treatment for Parkinsons.   Date of onset: 09/27/23    Dates & types of surgery: Two knee replacements - Over ten years ago. 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  Hobbies/Interests:      Patient goals for therapy: Stairs, sleeping without major discomfort, laundry, getting in/out of car    Pain assessment: Location: Left hip/Rating: aching, can be sharp     Objective   HIP " EVALUATION  PAIN: Pain Quality: Aching, Dull, and Sharp  Pain Frequency: constant  Pain is Exacerbated By: Stairs, walking, getting in/out of car  Pain is Relieved By: heat and rest  INTEGUMENTARY (edema, incisions): WNL  POSTURE: Protracted shoulders, forward head  GAIT:   Weightbearing Status: WBAT  Assistive Device(s): None  Gait Deviations: Does have a mild antalgic pattern with mild trendelenburg. Slower gait speed  BALANCE/PROPRIOCEPTION: Patient notes that she hasn't had any falls but has recently been treated for the start of Parkinson's Disease. She Has been more careful and realized that her walking and balance isn't as good as it has been in the past.   ROM:  Stiffness noted with hip adduction, hip flexion, and hip IR. She does have discomfort with hip ER however she is more stiff in hip IR.   PELVIC/SI SCREEN:  no major findings  STRENGTH:   Hip flexion: 5/5 bilaterally   Hip adduction: 5/5 bilaterally   Hip abduction: 5/5 on left however painful   Hip ER: 4/5 with pain   Knee flexion: 5/5   Knee extension: 5/5    SPECIAL TESTS:  Painful with scour testing. Pain with pressure to bursa of hip.   PALPATION: Discomfort noted with palpation to gluteus medius, piriformis, and around the greater trochanter.     Assessment & Plan   CLINICAL IMPRESSIONS  Medical Diagnosis: Greater trochanteric bursitis of left hip (M70.62), Muscle strain of gluteal region, left, initial encounter (S76.012A)    Treatment Diagnosis: Impaired mobility, decreased strength and endurance, left hip pain.   Impression/Assessment: Patient is a 68 year old female with Left hip complaints.  The following significant findings have been identified: Pain, Decreased ROM/flexibility, Decreased joint mobility, Decreased strength, Impaired balance, Impaired gait, Impaired muscle performance, and Decreased activity tolerance. These impairments interfere with their ability to perform self care tasks, recreational activities, household chores,  driving , household mobility, and community mobility as compared to previous level of function.     Clinical Decision Making (Complexity):  Clinical Presentation: Stable/Uncomplicated  Clinical Presentation Rationale: based on medical and personal factors listed in PT evaluation  Clinical Decision Making (Complexity): Low complexity    PLAN OF CARE  Treatment Interventions:  Modalities: Cryotherapy, E-stim, Hot Pack, Ultrasound, Vasoneumatic Device  Interventions: Gait Training, Manual Therapy, Neuromuscular Re-education, Therapeutic Activity, Therapeutic Exercise, Aquatic Therapy    Long Term Goals     PT Goal 1  Goal Identifier: Stairs  Goal Description: Patient will be able to ascend/descend 2 flights of stairs without an increase in leg pain in order to improve her overall mobility at home  Rationale: to maximize safety and independence within the home  Target Date: 11/28/23  PT Goal 2  Goal Identifier: Car  Goal Description: Patient will be able to get in/out of her car with no increase in leg pain in order to improve her overall mobility in the community  Rationale: to maximize safety and independence within the community  Target Date: 11/28/23  PT Goal 3  Goal Identifier: Sleep  Goal Description: Patient will be able to sleep a full night without being woken up by pain in order to improve her overall quality of life.  Rationale: to maximize safety and independence with performance of ADLs and functional tasks  Target Date: 11/28/23      Frequency of Treatment: 1-2 times per week  Duration of Treatment: 8 weeks    Education Assessment:   Learner/Method: Patient  Education Comments: Educated patient on hip anatomy    Risks and benefits of evaluation/treatment have been explained.   Patient/Family/caregiver agrees with Plan of Care.     Evaluation Time:     PT Eval, Low Complexity Minutes (41957): 30     Signing Clinician: Klever Valdes PT      [facility]                                                                                    OUTPATIENT PHYSICAL THERAPY      PLAN OF TREATMENT FOR OUTPATIENT REHABILITATION   Patient's Last Name, First Name, Merle Tejeda YOB: 1955   Provider's Name   [facility]   Medical Record No.  [id number]     Onset Date: 09/27/23  Start of Care Date: 10/03/23     Medical Diagnosis:  Greater trochanteric bursitis of left hip (M70.62), Muscle strain of gluteal region, left, initial encounter (S76.012A)      PT Treatment Diagnosis:  Impaired mobility, decreased strength and endurance, left hip pain. Plan of Treatment  Frequency/Duration: 1-2 times per week/ 8 weeks    Certification date from 10/03/23 to 11/28/23         See note for plan of treatment details and functional goals     Klever Valdes, PT                         I CERTIFY THE NEED FOR THESE SERVICES FURNISHED UNDER        THIS PLAN OF TREATMENT AND WHILE UNDER MY CARE .             Physician Signature               Date    X_____________________________________________________                    Referring Provider:  Kane Noble      Initial Assessment  See Epic Evaluation- Start of Care Date: 10/03/23

## 2023-10-04 ENCOUNTER — THERAPY VISIT (OUTPATIENT)
Dept: PHYSICAL THERAPY | Facility: OTHER | Age: 68
End: 2023-10-04
Attending: FAMILY MEDICINE
Payer: COMMERCIAL

## 2023-10-04 DIAGNOSIS — M70.62 GREATER TROCHANTERIC BURSITIS OF LEFT HIP: Primary | ICD-10-CM

## 2023-10-04 DIAGNOSIS — S76.012A MUSCLE STRAIN OF GLUTEAL REGION, LEFT, INITIAL ENCOUNTER: ICD-10-CM

## 2023-10-04 PROCEDURE — 97113 AQUATIC THERAPY/EXERCISES: CPT | Mod: GP,PN

## 2023-10-11 ENCOUNTER — THERAPY VISIT (OUTPATIENT)
Dept: PHYSICAL THERAPY | Facility: OTHER | Age: 68
End: 2023-10-11
Attending: FAMILY MEDICINE
Payer: COMMERCIAL

## 2023-10-11 DIAGNOSIS — S76.012A MUSCLE STRAIN OF GLUTEAL REGION, LEFT, INITIAL ENCOUNTER: ICD-10-CM

## 2023-10-11 DIAGNOSIS — M70.62 GREATER TROCHANTERIC BURSITIS OF LEFT HIP: Primary | ICD-10-CM

## 2023-10-11 PROCEDURE — 97113 AQUATIC THERAPY/EXERCISES: CPT | Mod: GP,PN

## 2023-10-17 ENCOUNTER — THERAPY VISIT (OUTPATIENT)
Dept: PHYSICAL THERAPY | Facility: OTHER | Age: 68
End: 2023-10-17
Attending: FAMILY MEDICINE
Payer: COMMERCIAL

## 2023-10-17 DIAGNOSIS — M70.62 GREATER TROCHANTERIC BURSITIS OF LEFT HIP: Primary | ICD-10-CM

## 2023-10-17 DIAGNOSIS — S76.012A MUSCLE STRAIN OF GLUTEAL REGION, LEFT, INITIAL ENCOUNTER: ICD-10-CM

## 2023-10-17 PROCEDURE — 97113 AQUATIC THERAPY/EXERCISES: CPT | Mod: GP,PN

## 2023-10-24 ENCOUNTER — THERAPY VISIT (OUTPATIENT)
Dept: PHYSICAL THERAPY | Facility: OTHER | Age: 68
End: 2023-10-24
Attending: FAMILY MEDICINE
Payer: COMMERCIAL

## 2023-10-24 DIAGNOSIS — M70.62 GREATER TROCHANTERIC BURSITIS OF LEFT HIP: Primary | ICD-10-CM

## 2023-10-24 DIAGNOSIS — S76.012A MUSCLE STRAIN OF GLUTEAL REGION, LEFT, INITIAL ENCOUNTER: ICD-10-CM

## 2023-10-24 PROCEDURE — 97113 AQUATIC THERAPY/EXERCISES: CPT | Mod: GP,PN

## 2023-10-26 ENCOUNTER — THERAPY VISIT (OUTPATIENT)
Dept: PHYSICAL THERAPY | Facility: OTHER | Age: 68
End: 2023-10-26
Attending: FAMILY MEDICINE
Payer: COMMERCIAL

## 2023-10-26 DIAGNOSIS — M70.62 GREATER TROCHANTERIC BURSITIS OF LEFT HIP: Primary | ICD-10-CM

## 2023-10-26 DIAGNOSIS — S76.012A MUSCLE STRAIN OF GLUTEAL REGION, LEFT, INITIAL ENCOUNTER: ICD-10-CM

## 2023-10-26 PROCEDURE — 97113 AQUATIC THERAPY/EXERCISES: CPT | Mod: GP,PN

## 2023-11-02 ENCOUNTER — THERAPY VISIT (OUTPATIENT)
Dept: PHYSICAL THERAPY | Facility: OTHER | Age: 68
End: 2023-11-02
Attending: FAMILY MEDICINE
Payer: COMMERCIAL

## 2023-11-02 DIAGNOSIS — S76.012A MUSCLE STRAIN OF GLUTEAL REGION, LEFT, INITIAL ENCOUNTER: ICD-10-CM

## 2023-11-02 DIAGNOSIS — M70.62 GREATER TROCHANTERIC BURSITIS OF LEFT HIP: Primary | ICD-10-CM

## 2023-11-02 PROCEDURE — 97113 AQUATIC THERAPY/EXERCISES: CPT | Mod: GP,PN

## 2023-11-07 ENCOUNTER — THERAPY VISIT (OUTPATIENT)
Dept: PHYSICAL THERAPY | Facility: OTHER | Age: 68
End: 2023-11-07
Attending: FAMILY MEDICINE
Payer: COMMERCIAL

## 2023-11-07 DIAGNOSIS — M70.62 GREATER TROCHANTERIC BURSITIS OF LEFT HIP: Primary | ICD-10-CM

## 2023-11-07 DIAGNOSIS — S76.012A MUSCLE STRAIN OF GLUTEAL REGION, LEFT, INITIAL ENCOUNTER: ICD-10-CM

## 2023-11-07 PROCEDURE — 97113 AQUATIC THERAPY/EXERCISES: CPT | Mod: GP,PN

## 2023-11-09 ENCOUNTER — THERAPY VISIT (OUTPATIENT)
Dept: PHYSICAL THERAPY | Facility: OTHER | Age: 68
End: 2023-11-09
Attending: FAMILY MEDICINE
Payer: COMMERCIAL

## 2023-11-09 DIAGNOSIS — M70.62 GREATER TROCHANTERIC BURSITIS OF LEFT HIP: Primary | ICD-10-CM

## 2023-11-09 DIAGNOSIS — S76.012A MUSCLE STRAIN OF GLUTEAL REGION, LEFT, INITIAL ENCOUNTER: ICD-10-CM

## 2023-11-09 PROCEDURE — 97113 AQUATIC THERAPY/EXERCISES: CPT | Mod: GP,PN

## 2023-11-14 ENCOUNTER — THERAPY VISIT (OUTPATIENT)
Dept: PHYSICAL THERAPY | Facility: OTHER | Age: 68
End: 2023-11-14
Attending: FAMILY MEDICINE
Payer: COMMERCIAL

## 2023-11-14 DIAGNOSIS — M70.62 GREATER TROCHANTERIC BURSITIS OF LEFT HIP: Primary | ICD-10-CM

## 2023-11-14 DIAGNOSIS — S76.012A MUSCLE STRAIN OF GLUTEAL REGION, LEFT, INITIAL ENCOUNTER: ICD-10-CM

## 2023-11-14 PROCEDURE — 97113 AQUATIC THERAPY/EXERCISES: CPT | Mod: GP,PN

## 2023-11-16 NOTE — PROGRESS NOTES
11/14/23 0500   Appointment Info   Signing clinician's name / credentials Xenia EUGENIO Moreau   Total/Authorized Visits 10   Visits Used 10/10   Medical Diagnosis Greater trochanteric bursitis of left hip (M70.62), Muscle strain of gluteal region, left, initial encounter (S76.012A)   PT Tx Diagnosis Impaired mobility, decreased strength and endurance, left hip pain.   Other pertinent information Greater trochanteric bursitis and glute strain, aquatic therapy   Quick Adds Certification   Progress Note/Certification   Start of Care Date 10/03/23   Onset of illness/injury or Date of Surgery 09/27/23   Therapy Frequency 1-2 times per week   Predicted Duration 8 weeks   Certification date from 10/03/23   Certification date to 11/28/23   Progress Note Due Date 11/28/23   Supervision   PT Assistant Visit Number 3       Present No   GOALS   PT Goals 2;3   PT Goal 1   Goal Identifier Stairs   Goal Description Patient will be able to ascend/descend 2 flights of stairs without an increase in leg pain in order to improve her overall mobility at home   Rationale to maximize safety and independence within the home   Target Date 11/28/23   PT Goal 2   Goal Identifier Car   Goal Description Patient will be able to get in/out of her car with no increase in leg pain in order to improve her overall mobility in the community   Rationale to maximize safety and independence within the community   Target Date 11/28/23   PT Goal 3   Goal Identifier Sleep   Goal Description Patient will be able to sleep a full night without being woken up by pain in order to improve her overall quality of life.   Rationale to maximize safety and independence with performance of ADLs and functional tasks   Target Date 11/28/23   Subjective Report   Subjective Report Pt in the pool this session, states she is still having some soreness in her hip and is thinking about getting a cortizone injection in her knee. She feels she has  improved with the strength in her legs and riding in a car has been much better, this will be her last pool session today and she will continue to come on her own.   Treatment Interventions (PT)   Interventions Therapeutic Procedure/Exercise;Aquatic Therapy   Aquatic Therapy   Aquatic Therapy Minutes (15874) 30   Aquatic Therapy 1 Warm Up   Aquatic Therapy 1 - Details Amb 3-5ft depth. Fwd/bck and side stepping 2 lengths ea, cues for correct posture and sequence. Hip flexor stretch bilat, fig 4 stretch. Repeated amb again with adding ankle floats 2lengths. Ended fig 4 stretch at end of session, cues to hold stretch for longer duration.   Aquatic Therapy 2 LE ex:   Aquatic Therapy 2 - Details ankle floats on LLE.Multi hip bilat cues for increased speed for added resistance, LAQ/HS curls with hip flexed to 90 deg. hip circles backwards,  backwards lunges no ankle floats, good control and cues for core, added hip extension with knee flexion.   Aquatic Therapy 3 Core/posture ex:   Aquatic Therapy 3 - Details Wides stance with UE floats, UE mvmvt into flex/ext, horz add/abd, added scoops alt.   Skilled Intervention education, technique   Patient Response/Progress good reivew of all exercises and pt is independent with pool HEP   Education   Learner/Method Patient   Education Comments Educated patient on hip anatomy   Plan   Home program Lumbar rotation in hooklyin minutes, twice daily, single knee to chest: 2x30 seconds, twice daily, bridging: 2x10, twice daily, seated hip ER: 2x10, twice daily   Plan for next session Pt's next session will be on land to assess goals and review exercises.   Comments   Comments Patient is wanting aquatic therapy for her hip.   Total Session Time   Timed Code Treatment Minutes 30   Total Treatment Time (sum of timed and untimed services) 30         PLAN  Continue therapy per current plan of care. Will transition to more pool exercises on her own. Has another on land visit in 2 weeks  where plans will be discussed how to proceed with land therapy.    Beginning/End Dates of Progress Note Reporting Period:   10/3/23 to 11/14/2023    Referring Provider:  Kane Noble

## 2024-02-05 NOTE — PROGRESS NOTES
11/14/23 0500   Appointment Info   Signing clinician's name / credentials Xenia EUGENIO Moreau   Total/Authorized Visits 10   Visits Used 10/10   Medical Diagnosis Greater trochanteric bursitis of left hip (M70.62), Muscle strain of gluteal region, left, initial encounter (S76.012A)   PT Tx Diagnosis Impaired mobility, decreased strength and endurance, left hip pain.   Other pertinent information Greater trochanteric bursitis and glute strain, aquatic therapy   Quick Adds Certification   Progress Note/Certification   Start of Care Date 10/03/23   Onset of illness/injury or Date of Surgery 09/27/23   Therapy Frequency 1-2 times per week   Predicted Duration 8 weeks   Certification date from 10/03/23   Certification date to 11/28/23   Progress Note Due Date 11/28/23   Supervision   PT Assistant Visit Number 3       Present No   GOALS   PT Goals 2;3   PT Goal 1   Goal Identifier Stairs   Goal Description Patient will be able to ascend/descend 2 flights of stairs without an increase in leg pain in order to improve her overall mobility at home   Rationale to maximize safety and independence within the home   Target Date 11/28/23   PT Goal 2   Goal Identifier Car   Goal Description Patient will be able to get in/out of her car with no increase in leg pain in order to improve her overall mobility in the community   Rationale to maximize safety and independence within the community   Target Date 11/28/23   PT Goal 3   Goal Identifier Sleep   Goal Description Patient will be able to sleep a full night without being woken up by pain in order to improve her overall quality of life.   Rationale to maximize safety and independence with performance of ADLs and functional tasks   Target Date 11/28/23   Subjective Report   Subjective Report Pt in the pool this session, states she is still having some soreness in her hip and is thinking about getting a cortizone injection in her knee. She feels she has  improved with the strength in her legs and riding in a car has been much better, this will be her last pool session today and she will continue to come on her own.   Treatment Interventions (PT)   Interventions Therapeutic Procedure/Exercise;Aquatic Therapy   Aquatic Therapy   Aquatic Therapy Minutes ([id number]) 30   Aquatic Therapy 1 Warm Up   Aquatic Therapy 1 - Details Amb 3-5ft depth. Fwd/bck and side stepping 2 lengths ea, cues for correct posture and sequence. Hip flexor stretch bilat, fig 4 stretch. Repeated amb again with adding ankle floats 2lengths. Ended fig 4 stretch at end of session, cues to hold stretch for longer duration.   Aquatic Therapy 2 LE ex:   Aquatic Therapy 2 - Details ankle floats on LLE.Multi hip bilat cues for increased speed for added resistance, LAQ/HS curls with hip flexed to 90 deg. hip circles backwards,  backwards lunges no ankle floats, good control and cues for core, added hip extension with knee flexion.   Aquatic Therapy 3 Core/posture ex:   Aquatic Therapy 3 - Details Wides stance with UE floats, UE mvmvt into flex/ext, horz add/abd, added scoops alt.   Skilled Intervention education, technique   Patient Response/Progress good reivew of all exercises and pt is independent with pool HEP   Education   Learner/Method Patient   Education Comments Educated patient on hip anatomy   Plan   Home program Lumbar rotation in hooklyin minutes, twice daily, single knee to chest: 2x30 seconds, twice daily, bridging: 2x10, twice daily, seated hip ER: 2x10, twice daily   Plan for next session Pt's next session will be on land to assess goals and review exercises.   Comments   Comments Patient is wanting aquatic therapy for her hip.   Total Session Time   Timed Code Treatment Minutes 30   Total Treatment Time (sum of timed and untimed services) 30     Unable to assess progress towards goals on this date because of an unplanned discharge.    DISCHARGE  Reason for Discharge: No further visits  were scheduled after pool therapy.     Equipment Issued: none    Discharge Plan: Patient to continue home program. Follow up with physician as needed.     Referring Provider:  Kane Noble